# Patient Record
Sex: MALE | Race: WHITE
[De-identification: names, ages, dates, MRNs, and addresses within clinical notes are randomized per-mention and may not be internally consistent; named-entity substitution may affect disease eponyms.]

---

## 2017-01-04 ENCOUNTER — HOSPITAL ENCOUNTER (EMERGENCY)
Dept: HOSPITAL 17 - NEPA | Age: 47
LOS: 1 days | Discharge: HOME | End: 2017-01-05
Payer: COMMERCIAL

## 2017-01-04 VITALS
SYSTOLIC BLOOD PRESSURE: 147 MMHG | OXYGEN SATURATION: 97 % | DIASTOLIC BLOOD PRESSURE: 83 MMHG | TEMPERATURE: 98.1 F | HEART RATE: 83 BPM | RESPIRATION RATE: 16 BRPM

## 2017-01-04 VITALS — HEIGHT: 73 IN | BODY MASS INDEX: 23.37 KG/M2 | WEIGHT: 176.37 LBS

## 2017-01-04 DIAGNOSIS — F10.129: Primary | ICD-10-CM

## 2017-01-04 DIAGNOSIS — F17.210: ICD-10-CM

## 2017-01-04 PROCEDURE — 99284 EMERGENCY DEPT VISIT MOD MDM: CPT
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
## 2017-01-05 VITALS
RESPIRATION RATE: 16 BRPM | SYSTOLIC BLOOD PRESSURE: 118 MMHG | HEART RATE: 72 BPM | OXYGEN SATURATION: 99 % | DIASTOLIC BLOOD PRESSURE: 66 MMHG

## 2017-09-16 ENCOUNTER — HOSPITAL ENCOUNTER (EMERGENCY)
Dept: HOSPITAL 17 - NEPC | Age: 47
Discharge: HOME | End: 2017-09-16
Payer: COMMERCIAL

## 2017-09-16 VITALS
RESPIRATION RATE: 16 BRPM | OXYGEN SATURATION: 95 % | TEMPERATURE: 98.6 F | SYSTOLIC BLOOD PRESSURE: 117 MMHG | DIASTOLIC BLOOD PRESSURE: 76 MMHG | HEART RATE: 94 BPM

## 2017-09-16 VITALS — HEIGHT: 72 IN | WEIGHT: 154.32 LBS | BODY MASS INDEX: 20.9 KG/M2

## 2017-09-16 DIAGNOSIS — F10.920: Primary | ICD-10-CM

## 2017-09-16 PROCEDURE — 99281 EMR DPT VST MAYX REQ PHY/QHP: CPT
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
## 2018-02-26 ENCOUNTER — HOSPITAL ENCOUNTER (EMERGENCY)
Dept: HOSPITAL 17 - NEPK | Age: 48
Discharge: HOME | End: 2018-02-26
Payer: COMMERCIAL

## 2018-02-26 VITALS — BODY MASS INDEX: 33.89 KG/M2 | WEIGHT: 250.22 LBS | HEIGHT: 72 IN

## 2018-02-26 VITALS
DIASTOLIC BLOOD PRESSURE: 65 MMHG | SYSTOLIC BLOOD PRESSURE: 124 MMHG | TEMPERATURE: 98.2 F | RESPIRATION RATE: 16 BRPM | OXYGEN SATURATION: 97 % | HEART RATE: 94 BPM

## 2018-02-26 DIAGNOSIS — S22.41XA: Primary | ICD-10-CM

## 2018-02-26 DIAGNOSIS — F17.200: ICD-10-CM

## 2018-02-26 DIAGNOSIS — F12.90: ICD-10-CM
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
## 2018-02-26 PROCEDURE — 94150 VITAL CAPACITY TEST: CPT

## 2018-02-26 PROCEDURE — 71101 X-RAY EXAM UNILAT RIBS/CHEST: CPT

## 2018-02-26 PROCEDURE — 99283 EMERGENCY DEPT VISIT LOW MDM: CPT
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
